# Patient Record
Sex: FEMALE | Race: WHITE | URBAN - METROPOLITAN AREA
[De-identification: names, ages, dates, MRNs, and addresses within clinical notes are randomized per-mention and may not be internally consistent; named-entity substitution may affect disease eponyms.]

---

## 2021-11-04 ENCOUNTER — HOSPITAL ENCOUNTER (OUTPATIENT)
Dept: LAB | Age: 56
Discharge: HOME | End: 2021-11-04

## 2021-11-04 PROCEDURE — 87624 HPV HI-RISK TYP POOLED RSLT: CPT

## 2021-11-04 PROCEDURE — 88175 CYTOPATH C/V AUTO FLUID REDO: CPT

## 2023-01-16 NOTE — PROGRESS NOTES
[address] [facility] at [address] [place] [address]  [phone]    Hematology / Oncology Consult    Reason for Visit:   Alannah Gan is a 62 y.o. female who is seen in consultation at the request of [facility] for evaluation of breast cancer. Hematology Oncology Treatment History:     Diagnosis: Intraductal papilloma, R breast    Pathology:   4/19/22 Right breast mass biopsy: intraductal papilloma. Comment: The papilloma is somewhat fragmented. Immunoperoxidase stains were performed and smooth muscle heavy chain immunoperoxidase stain is intact, ruling out invasion. A CK 5/6 immunoperoxidase stain shows a mixed pattern ruling out in-situ carcinoma. History of Present Illness:   Alannah Day is a 62 y.o. female who is referred for intraductal papilloma. Very limited records sent to me prior to this visit. Patient reports that her PCP noticed a R breast lump, which led to mammogram and biopsy of R breast mass. Pt does not recall which month her mammogram was, but thinks it was in Jan or Feb 2022. Prior one was approx 3 years prior. Pt denies any nipple discharge, breast pain, etc. Breast biopsy in 4/2022 showed intraductal papilloma per outside records. However, pt states she was told she has breast cancer and referred to me for this reason. No chest pain, SOB, cough, abdominal pain, LN swelling, palpable breast mass per patient. Patient is French speaking and visit is conducted with video interpretor. Pt is accompanied by her granddaughter Walker Sutton and friend Balbir Priest today. History reviewed. No pertinent past medical history. Past Surgical History:   Procedure Laterality Date    HX OTHER SURGICAL Right     Pt not sure what happened during surgery but it happened under the breast.      Social History     Tobacco Use    Smoking status: Never    Smokeless tobacco: Not on file   Substance Use Topics    Alcohol use:  Yes     Alcohol/week: 1.0 standard drink Types: 1 Cans of beer per week     Comment: once a year      History reviewed. No pertinent family history. No family h/o breast cancer. Mother pass away from gastric cancer. Current Outpatient Medications   Medication Sig    loratadine (CLARITIN) 10 mg tablet Take 1 Tab by mouth daily. [place] 1 al yamini    acetaminophen (TYLENOL) 325 mg tablet Take 1 Tab by mouth every four (4) hours as needed for Pain.    mometasone (NASONEX) 50 mcg/actuation nasal spray 2 Sprays by Both Nostrils route daily. No current facility-administered medications for this visit. Allergies   Allergen Reactions    Seafood Rash        Review of Systems: A complete review of systems was obtained, negative except as described above. Physical Exam:   Blood pressure (!) 143/84, pulse 86, temperature 97.8 °F (36.6 °C), temperature source Oral, resp. rate 18, height 4' 9\" (1.448 m), weight 170 lb 9.6 oz (77.4 kg), SpO2 96 %. ECOG PS: 0  General: Well developed, no acute distress  Eyes: PERRLA, EOMI, anicteric sclerae  HENT: Atraumatic, OP clear  Neck: Supple, no JVD or thyromegaly  Lymphatic: No cervical, supraclavicular, axillary adenopathy  Breast: Bilateral breast exam normal without any masses, skin changes, nipple retraction. Respiratory: CTAB, normal respiratory effort  CV: Normal rate, regular rhythm, no murmurs, no peripheral edema  GI: Soft, nontender, nondistended, no masses, no hepatomegaly, no splenomegaly  MS: Normal gait and station. Digits without clubbing or cyanosis. Skin: No rashes, ecchymoses, or petechiae. Normal temperature, turgor, and texture. Neuro/Psych: Alert, oriented. 5/5 strength in all 4 extremities. Appropriate affect, normal judgment/insight.     Results:   No results found for: WBC, HGB, HCT, PLT, MCV, ANEU, HGBPOC, HCTPOC, HGBEXT, HCTEXT, PLTEXT  No results found for: NA, K, CL, CO2, GLU, BUN, CREA, GFRAA, GFRNA, CA, NAPOC, KPOCT, CLPOC, GLUCPOC, IBUN, CREAPOC, ICAI  No results found for: TBILI, ALT, AP, TP, ALB, GLOB  No results found for: IRON, FE, TIBC, IBCT, PSAT, FERR    No results found for: B12LT, FOL, RBCF  No results found for: TSH, TSH2, TSH3, TSHP, TSHEXT  No results found for: HAMAT, HAAB, HABT, HAAT, HBSAG, HBSB, HBSAT, HBABN, HBCM, HBCAB, HBCAT, XBCABS, HBEAB, HBEAG, XHEPCS, [id number], HBEGLT, HBCMLT, HBCLT, HBEBLT, [id number], [id number], [facility], [id number], HBCMLT, [id number], HCGAT      Imaging:     No radiology reports provided for my review. Assessment & Plan:   Rosa Barksdale is a 62 y.o. female is referred for intraductal papilloma. 1. Intraductal papilloma:  Discussed with patient that this is a benign growth in the milk duct of her breast, which can sometimes cause nipple discharge. I do not see any mention of breast cancer. No medical oncology management needed at this time. However, as an invasive cancer can sometimes be missed on a needle biopsy, I recommend she see a breast surgeon for follow up. Also, pt is due for repeat mammogram.   Attempted to call BENJI Williamson's office for additional records and information today, but no answer. -- Repeat mammogram now  -- Refer to Breast surgeon; appt made for Dr. Alberto Sargent 5/5/94  -- Request mammogram report from Feb 2022  -- Follow up as needed. 2. Health maintenance:  Discussed importance of annual mammogram, pelvic exam. Also recommend screening colonoscopy at age 36-53 and then per current screening guidelines. Pt to follow up with PCP regarding timing of these tests. I appreciate the opportunity to participate in Ms. Rosa Gan's care.     I personally spent a total of 45 minutes on patient care on date of this encounter, regarding the following:  Reviewing prior medical records which I summarized in the history above, taking history using video interpretor, discussing diagnoses with the patient as listed in the assessment, ordering blood tests and imaging as listed in the plan, arranging follow up as well as documentation of this encounter.      Signed By: Moshe Castañeda MD     January 16, 2023

## 2023-01-17 ENCOUNTER — OFFICE VISIT (OUTPATIENT)
Dept: ONCOLOGY | Age: 58
End: 2023-01-17

## 2023-01-17 VITALS
HEIGHT: 57 IN | OXYGEN SATURATION: 96 % | RESPIRATION RATE: 18 BRPM | HEART RATE: 86 BPM | DIASTOLIC BLOOD PRESSURE: 84 MMHG | WEIGHT: 170.6 LBS | TEMPERATURE: 97.8 F | SYSTOLIC BLOOD PRESSURE: 143 MMHG | BODY MASS INDEX: 36.8 KG/M2

## 2023-01-17 DIAGNOSIS — D24.1 INTRADUCTAL PAPILLOMA OF BREAST, RIGHT: Primary | ICD-10-CM

## 2023-01-17 DIAGNOSIS — Z00.00 HEALTHCARE MAINTENANCE: ICD-10-CM

## 2023-01-17 PROCEDURE — 99204 OFFICE O/P NEW MOD 45 MIN: CPT | Performed by: INTERNAL MEDICINE

## 2023-01-17 NOTE — PROGRESS NOTES
1. Have you been to the ER, urgent care clinic since your last visit? Hospitalized since your last visit? No    2. Have you seen or consulted any other health care providers outside of the [address] [place] [address] since your last visit? Include any pap smears or colon screening.  No        Visit Vitals  BP (!) 143/84 (BP 1 Location: Right upper arm, BP Patient Position: Sitting, BP Cuff Size: Large adult)   Pulse 86   Temp 97.8 °F (36.6 °C) (Oral)   Resp 18   Ht 4' 9\" (1.448 m)   Wt 170 lb 9.6 oz (77.4 kg)   SpO2 96%   BMI 36.92 kg/m²            Chief Complaint   Patient presents with    New Patient    Breast Cancer

## 2023-02-01 ENCOUNTER — DOCUMENTATION ONLY (OUTPATIENT)
Dept: SURGERY | Age: 58
End: 2023-02-01

## 2023-02-01 ENCOUNTER — OFFICE VISIT (OUTPATIENT)
Dept: SURGERY | Age: 58
End: 2023-02-01
Payer: MEDICAID

## 2023-02-01 VITALS — BODY MASS INDEX: 36.24 KG/M2 | WEIGHT: 168 LBS | HEIGHT: 57 IN

## 2023-02-01 DIAGNOSIS — D24.1 PAPILLOMA OF RIGHT BREAST: Primary | ICD-10-CM

## 2023-02-01 PROCEDURE — 76642 ULTRASOUND BREAST LIMITED: CPT | Performed by: SURGERY

## 2023-02-01 PROCEDURE — 99203 OFFICE O/P NEW LOW 30 MIN: CPT | Performed by: SURGERY

## 2023-02-01 RX ORDER — LOSARTAN POTASSIUM 50 MG/1
50 TABLET ORAL DAILY
COMMUNITY
Start: 2022-12-09

## 2023-02-01 NOTE — PROGRESS NOTES
Reports received and received notice that patient's mammogram discs were mailed to [facility] on 2/1/22.

## 2023-02-01 NOTE — PROGRESS NOTES
HISTORY OF PRESENT ILLNESS  Adrien Kirkpatrick is a 62 y.o. female. HPI  NEW patient consult referred by Dr. Opal Jain for RIGHT breast intraductal papilloma. Found by imaging. Denies any changes to breasts. Malina  ID #: [id number]          Family History: Mother, uterine cancer         No imaging available today. Patient says she gets mammograms done at [facility].          Review of Systems   All other systems reviewed and are negative. No past medical history on file. Past Surgical History:   Procedure Laterality Date    HX OTHER SURGICAL Right     Pt not sure what happened during surgery but it happened under the breast.       Social History     Socioeconomic History    Marital status: LEGALLY      Spouse name: Not on file    Number of children: Not on file    Years of education: Not on file    Highest education level: Not on file   Occupational History    Not on file   Tobacco Use    Smoking status: Never    Smokeless tobacco: Not on file   Vaping Use    Vaping Use: Never used   Substance and Sexual Activity    Alcohol use: Yes     Alcohol/week: 1.0 standard drink     Types: 1 Cans of beer per week     Comment: once a year    Drug use: Never    Sexual activity: Not on file   Other Topics Concern    Not on file   Social History Narrative    Not on file     Social Determinants of Health     Financial Resource Strain: Not on file   Food Insecurity: Not on file   Transportation Needs: Not on file   Physical Activity: Not on file   Stress: Not on file   Social Connections: Not on file   Intimate Partner Violence: Not on file   Housing Stability: Not on file       Current Outpatient Medications on File Prior to Visit   Medication Sig Dispense Refill    loratadine (CLARITIN) 10 mg tablet Take 1 Tab by mouth daily. [place] 1 al yamini 30 Tab 1    acetaminophen (TYLENOL) 325 mg tablet Take 1 Tab by mouth every four (4) hours as needed for Pain.  20 Tab 1    mometasone (NASONEX) 50 mcg/actuation nasal spray 2 Sprays by Both Nostrils route daily. (Patient not taking: Reported on 1/17/2023) 1 Container 2     No current facility-administered medications on file prior to visit. Allergies   Allergen Reactions    Seafood Rash       OB History    No obstetric history on file. ROS      Physical Exam  Exam conducted with a chaperone present. Constitutional:       Appearance: She is well-developed. She is not diaphoretic. HENT:      Head: Normocephalic and atraumatic. Right Ear: External ear normal.      Left Ear: External ear normal.   Eyes:      General: No scleral icterus. Right eye: No discharge. Left eye: No discharge. Pupils: Pupils are equal, round, and reactive to light. Neck:      Thyroid: No thyromegaly. Vascular: No JVD. Trachea: No tracheal deviation. Cardiovascular:      Rate and Rhythm: Normal rate and regular rhythm. Heart sounds: Normal heart sounds. Pulmonary:      Effort: Pulmonary effort is normal. No tachypnea, accessory muscle usage or respiratory distress. Breath sounds: Normal breath sounds. No stridor. Chest:   Breasts:     Breasts are symmetrical.      Right: No inverted nipple, mass, nipple discharge, skin change or tenderness. Left: No inverted nipple, mass, nipple discharge, skin change or tenderness. Abdominal:      General: There is no distension. Palpations: Abdomen is soft. There is no mass. Tenderness: There is no abdominal tenderness. Musculoskeletal:         General: Normal range of motion. Cervical back: Normal range of motion and neck supple. Lymphadenopathy:      Cervical: No cervical adenopathy. Skin:     General: Skin is warm and dry. Neurological:      Mental Status: She is alert and oriented to person, place, and time. Psychiatric:         Speech: Speech normal.         Behavior: Behavior normal.         Thought Content:  Thought content normal. Judgment: Judgment normal.           Imaging & Procedures  BREAST ULTRASOUND  Indication: RIGHT breast  Technique: The area was scanned using a high-frequency linear-array near-field transducer  Findings: Clip and biopsy site not visible  Impression: Intraductal papilloma  Disposition: Patient has elected for excision. ASSESSMENT and PLAN    ICD-10-CM ICD-9-CM    1. Papilloma of right breast  D24.1 1         New patient presents for evaluation of RIGHT breast intraductal papilloma, and is doing well overall. Upon physical examination of the RIGHT breast noted nothing palpable. Clip and biopsy site are not visible on US. Will schedule for ductal excision with magseed. This plan was reviewed with the patient and patient agrees. All questions were answered. Total time spent was 40 minutes.         Written by Dick Arias, as dictated by Dr. Emily Riley MD.

## 2023-02-01 NOTE — PROGRESS NOTES
HISTORY OF PRESENT ILLNESS  Keiko Navarro is a 62 y.o. female. HPI NEW patient consult referred by Dr. Xiang Sales for RIGHT breast intraductal papilloma. Found by imaging. Denies any changes to breasts. Malina Hill ID #: [id number]      Family History: Mother, uterine cancer       No imaging available today. Patient says she gets mammograms done at [facility].      Review of Systems   All other systems reviewed and are negative.     Physical Exam    ASSESSMENT and PLAN  {ASSESSMENT/PLAN:[id number]}

## 2023-02-01 NOTE — PROGRESS NOTES
Faxed imaging report request to [facility]. Also asked that they mail breast imaging CDs to [facility] office. Confirmation received.

## 2023-02-07 ENCOUNTER — DOCUMENTATION ONLY (OUTPATIENT)
Dept: SURGERY | Age: 58
End: 2023-02-07

## 2023-02-07 NOTE — PROGRESS NOTES
Received patient mammogram (CD) & reports from [facility] by mail. Will put in box on [email or web address].

## 2023-02-08 ENCOUNTER — DOCUMENTATION ONLY (OUTPATIENT)
Dept: SURGERY | Age: 58
End: 2023-02-08

## 2023-02-10 DIAGNOSIS — D24.1 BENIGN NEOPLASM OF RIGHT BREAST: Primary | ICD-10-CM

## 2023-02-24 ENCOUNTER — HOSPITAL ENCOUNTER (OUTPATIENT)
Dept: MAMMOGRAPHY | Age: 58
Discharge: HOME | End: 2023-02-24
Attending: SURGERY
Payer: MEDICAID

## 2023-02-24 DIAGNOSIS — D24.1 BENIGN NEOPLASM OF RIGHT BREAST: ICD-10-CM

## 2023-02-24 PROCEDURE — 74011000250 HC RX REV CODE- 250: Performed by: RADIOLOGY

## 2023-02-24 PROCEDURE — 19283 PERQ DEV BREAST 1ST STRTCTC: CPT

## 2023-02-24 RX ORDER — LIDOCAINE HYDROCHLORIDE 10 MG/ML
5 INJECTION INFILTRATION; PERINEURAL
Status: COMPLETED | OUTPATIENT
Start: 2023-02-24 | End: 2023-02-24

## 2023-02-24 RX ADMIN — LIDOCAINE HYDROCHLORIDE 5 ML: 10 INJECTION, SOLUTION INFILTRATION; PERINEURAL at 10:49

## 2023-03-03 ENCOUNTER — HOSPITAL ENCOUNTER (OUTPATIENT)
Dept: PREADMISSION TESTING | Age: 58
End: 2023-03-03
Payer: MEDICAID

## 2023-03-03 VITALS
HEIGHT: 57 IN | TEMPERATURE: 97.9 F | WEIGHT: 170.19 LBS | RESPIRATION RATE: 18 BRPM | SYSTOLIC BLOOD PRESSURE: 147 MMHG | DIASTOLIC BLOOD PRESSURE: 80 MMHG | BODY MASS INDEX: 36.72 KG/M2 | HEART RATE: 98 BPM

## 2023-03-03 LAB
ATRIAL RATE: 95 BPM
BASOPHILS # BLD: 0 K/UL (ref 0–0.1)
BASOPHILS NFR BLD: 1 % (ref 0–1)
CALCULATED P AXIS, ECG09: 46 DEGREES
CALCULATED R AXIS, ECG10: 10 DEGREES
CALCULATED T AXIS, ECG11: 54 DEGREES
DIAGNOSIS, 93000: NORMAL
DIFFERENTIAL METHOD BLD: ABNORMAL
EOSINOPHIL # BLD: 0.2 K/UL (ref 0–0.4)
EOSINOPHIL NFR BLD: 2 % (ref 0–7)
ERYTHROCYTE [DISTWIDTH] IN BLOOD BY AUTOMATED COUNT: 13 % (ref 11.5–14.5)
HCT VFR BLD AUTO: 39.7 % (ref 35–47)
HGB BLD-MCNC: 12.9 G/DL (ref 11.5–16)
IMM GRANULOCYTES # BLD AUTO: 0 K/UL (ref 0–0.04)
IMM GRANULOCYTES NFR BLD AUTO: 1 % (ref 0–0.5)
LYMPHOCYTES # BLD: 2.7 K/UL (ref 0.8–3.5)
LYMPHOCYTES NFR BLD: 30 % (ref 12–49)
MCH RBC QN AUTO: 29.5 PG (ref 26–34)
MCHC RBC AUTO-ENTMCNC: 32.5 G/DL (ref 30–36.5)
MCV RBC AUTO: 90.6 FL (ref 80–99)
MONOCYTES # BLD: 0.7 K/UL (ref 0–1)
MONOCYTES NFR BLD: 8 % (ref 5–13)
NEUTS SEG # BLD: 5.2 K/UL (ref 1.8–8)
NEUTS SEG NFR BLD: 58 % (ref 32–75)
NRBC # BLD: 0 K/UL (ref 0–0.01)
NRBC BLD-RTO: 0 PER 100 WBC
P-R INTERVAL, ECG05: 148 MS
PLATELET # BLD AUTO: 288 K/UL (ref 150–400)
PMV BLD AUTO: 10.9 FL (ref 8.9–12.9)
Q-T INTERVAL, ECG07: 360 MS
QRS DURATION, ECG06: 90 MS
QTC CALCULATION (BEZET), ECG08: 452 MS
RBC # BLD AUTO: 4.38 M/UL (ref 3.8–5.2)
VENTRICULAR RATE, ECG03: 95 BPM
WBC # BLD AUTO: 8.8 K/UL (ref 3.6–11)

## 2023-03-03 PROCEDURE — 93005 ELECTROCARDIOGRAM TRACING: CPT

## 2023-03-03 PROCEDURE — 85025 COMPLETE CBC W/AUTO DIFF WBC: CPT

## 2023-03-03 PROCEDURE — 36415 COLL VENOUS BLD VENIPUNCTURE: CPT

## 2023-03-03 NOTE — PERIOP NOTES
COPY OF COVID VACCINE CARD PLACED ON CHART. PT Latvian SPEAKING. PAT APPOINTMENT COMPLETED USING [employer] VIDEO  DANNA #[id number]. PT STATES SHE DOES NOT WANT TO RECEIVE ANY BLOOD PRODUCTS. PT STATES SHE RECEIVED A BLOOD TRANSFUSION MANY YEARS AGO AND IT WAS \"NOT GOOD\" FOR HER. SHE STATES SHE WILL ACCEPT HER FAMILY'S BLOOD. PT NEEDS TO SPEAK WITH PHYSICIAN. ABIMBOLA SCORE 5. REQUESTING SLEEP REFERRAL. PT INFO GIVEN TO Marlon Chambers NP.     PT STATES  [address]

## 2023-03-03 NOTE — PERIOP NOTES
[address] INSTRUCTIONS    Surgery Date:   3/9/23    Your surgeon's office or [facility] staff will call you between 4 PM- 8 PM the day before surgery with your arrival time. If your surgery is on a Monday, you will receive a call the preceding Friday. Please report to [address] Patient Access/Admitting on the 1st floor. Bring your insurance card, photo identification, and any copayment ( if applicable). If you are going home the same day of your surgery, you must have a responsible adult to drive you home. You need to have a responsible adult to stay with you the first 24 hours after surgery and you should not drive a car for 24 hours following your surgery. Do NOT eat any solid foods after midnight the night before surgery including candy, mint or gum. You may drink clear liquids from midnight until 1 hour prior to your arrival. You may drink up to 12 ounces at one time every 4 hours. Please note special instructions, if applicable, below for medications. Do NOT drink alcohol or smoke 24 hours before surgery. STOP smoking for 14 days prior as it helps with breathing and healing after surgery. If you are being admitted to the hospital, please leave personal belongings/luggage in your car until you have an assigned hospital room number. Please wear comfortable clothes. Wear your glasses instead of contacts. We ask that all money, jewelry and valuables be left at home. Wear no make up, particularly mascara, the day of surgery. All body piercings, rings, and jewelry need to be removed and left at home. Please remove any nail polish or artifical nails from your fingernails. Please wear your hair loose or down. Please no pony-tails, buns, or any metal hair accessories. If you shower the morning of surgery, please do not apply any lotions or powders afterwards. You may wear deodorant, unless having breast surgery. Do not shave any body area within 24 hours of your surgery.   Please follow all instructions to avoid any potential surgical cancellation. Should your physical condition change, (i.e. fever, cold, flu, etc.) please notify your surgeon as soon as possible. It is important to be on time. If a situation occurs where you may be delayed, please call:  [phone] / [phone] on the day of surgery. The Preadmission Testing staff can be reached at [phone]. Special instructions: NONE      No current outpatient medications on file. No current facility-administered medications for this encounter. YOU MUST ONLY TAKE THESE MEDICATIONS THE MORNING OF SURGERY WITH A SIP OF WATER: NONE  MEDICATIONS TO TAKE THE MORNING OF SURGERY ONLY IF NEEDED: NONE  HOLD these prescription medications BEFORE Surgery: NONE  Ask your surgeon/prescribing physician about when/if to STOP taking these medications: NONE  Stop all vitamins, herbal medicines and Aspirin containing products 7 days prior to surgery. Stop any non-steroidal anti-inflammatory drugs (i.e. Ibuprofen, Naproxen, Advil, Aleve) 3 days before surgery. You may take Tylenol. If you are currently taking Plavix, Coumadin,or any other blood-thinning/anticoagulant medication contact your prescribing physician for instructions. Eating and Drinking Before Surgery    You may eat a regular dinner at the usual time on the day before your surgery. Do NOT eat any solid foods after midnight unless your arrival time at the hospital is 3pm or later. You may drink clear liquids only from 12 midnight until 1 hours prior to your arrival time at the hospital on the day of your surgery. Do NOT drink alcohol.   Clear liquids include:  Water  Fruit juices without pulp( i.e. apple juice)  Carbonated beverages  Black coffee (no cream/milk)  Tea (no cream/milk)  Gatorade  You may drink up to 12-16 ounces at one time every 4 hours between the hours of midnight and 1 hour before your arrival time at the hospital. Example- if your arrival time at the hospital is 6am, you may drink 12-16 ounces of clear liquids no later than 5am.  If your arrival time at the hospital is 3pm or later, you may eat a light breakfast before 8am.  A light breakfast includes: Toast or bagel (no butter)  Black coffee (no cream/milk)  Tea (no cream/milk)  Fruit juices without pulp ( i.e. apple juice)  Do NOT eat meat, eggs, vegetables or fruit  If you have any questions, please contact your surgeon's office. Preventing Infections Before and After - Your Surgery    IMPORTANT INSTRUCTIONS    You play an important role in your health and preparation for surgery. To reduce the germs on your skin you will need to shower with CHG soap (Chorhexidine gluconate 4%) two times before surgery. CHG soap (Hibiclens, Hex-A-Clens or store brand)  CHG soap will be provided at your Preadmission Testing (PAT) appointment. If you do not have a PAT appointment before surgery, you may arrange to  CHG soap from our office or purchase CHG soap at a pharmacy, grocery or department store. You need to purchase TWO 4 ounce bottles to use for your 2 showers. Steps to follow:  Maridee Pac your hair with your normal shampoo and your body with regular soap and rinse well to remove shampoo and soap from your skin. Wet a clean washcloth and turn off the shower. Put CHG soap on washcloth and apply to your entire body from the neck down. Do not use on your head, face or private parts(genitals). Do not use CHG soap on open sores, wounds or areas of skin irritation. Wash you body gently for 5 minutes. Do not wash your skin too hard. This soap does not create lather. Pay special attention to your underarms and from your belly button to your feet. Turn the shower back on and rinse well to get CHG soap off your body. Pat your skin dry with a clean, dry towel. Do not apply lotions or moisturizer. Put on clean clothes and sleep on fresh bed sheets and do not allow pets to sleep with you.     Shower with CHG soap 2 times before your surgery  The evening before your surgery  The morning of your surgery      Tips to help prevent infections after your surgery:  Protect your surgical wound from germs:  Hand washing is the most important thing you and your caregivers can do to prevent infections. Keep your bandage clean and dry! Do not touch your surgical wound. Use clean, freshly washed towels and washcloths every time you shower; do not share bath linens with others. Until your surgical wound is healed, wear clothing and sleep on bed linens each day that are clean and freshly washed. Do not allow pets to sleep in your bed with you or touch your surgical wound. Do not smoke - smoking delays wound healing. This may be a good time to stop smoking. If you have diabetes, it is important for you to manage your blood sugar levels properly before your surgery as well as after your surgery. Poorly managed blood sugar levels slow down wound healing and prevent you from healing completely. Patient Information Regarding COVID Restrictions      Day of Procedure    Please park in the parking deck or any designated visitor parking lot. Enter the facility through the Main Entrance of the hospital.  On the day of surgery, please provide the cell phone number of the person who will be waiting for you to the Patient Access representative at the time of registration. Masks are highly recommended in the hospital, but not required. Once your procedure and the immediate recovery period is completed, a nurse in the recovery area will contact your designated visitor to inform them of your room number or to otherwise review other pertinent information regarding your care. Social distancing practices are strongly encouraged in waiting areas and the cafeteria. The patient and  were contacted  in person. They verbalized understanding of all instructions and do not  need reinforcement.

## 2023-03-09 ENCOUNTER — ANESTHESIA (OUTPATIENT)
Dept: MEDSURG UNIT | Age: 58
End: 2023-03-09
Payer: MEDICAID

## 2023-03-09 ENCOUNTER — HOSPITAL ENCOUNTER (OUTPATIENT)
Age: 58
Setting detail: OUTPATIENT SURGERY
Discharge: HOME | End: 2023-03-09
Attending: SURGERY | Admitting: SURGERY
Payer: MEDICAID

## 2023-03-09 ENCOUNTER — ANESTHESIA EVENT (OUTPATIENT)
Dept: MEDSURG UNIT | Age: 58
End: 2023-03-09
Payer: MEDICAID

## 2023-03-09 VITALS
HEART RATE: 97 BPM | SYSTOLIC BLOOD PRESSURE: 137 MMHG | OXYGEN SATURATION: 96 % | RESPIRATION RATE: 28 BRPM | BODY MASS INDEX: 36.79 KG/M2 | WEIGHT: 170 LBS | TEMPERATURE: 97.8 F | DIASTOLIC BLOOD PRESSURE: 87 MMHG

## 2023-03-09 DIAGNOSIS — D24.1 BENIGN NEOPLASM OF RIGHT BREAST: ICD-10-CM

## 2023-03-09 PROCEDURE — 77030002996 HC SUT SLK J&J -A: Performed by: SURGERY

## 2023-03-09 PROCEDURE — 2709999900 HC NON-CHARGEABLE SUPPLY: Performed by: SURGERY

## 2023-03-09 PROCEDURE — 74011250636 HC RX REV CODE- 250/636: Performed by: ANESTHESIOLOGY

## 2023-03-09 PROCEDURE — 77030011266 HC ELECTRD BLD INSL COVD -A: Performed by: SURGERY

## 2023-03-09 PROCEDURE — 76210000035 HC AMBSU PH I REC 1 TO 1.5 HR: Performed by: SURGERY

## 2023-03-09 PROCEDURE — 74011000250 HC RX REV CODE- 250: Performed by: NURSE ANESTHETIST, CERTIFIED REGISTERED

## 2023-03-09 PROCEDURE — 88342 IMHCHEM/IMCYTCHM 1ST ANTB: CPT

## 2023-03-09 PROCEDURE — 77030010507 HC ADH SKN DERMBND J&J -B: Performed by: SURGERY

## 2023-03-09 PROCEDURE — 76030000000 HC AMB SURG OR TIME 0.5 TO 1: Performed by: SURGERY

## 2023-03-09 PROCEDURE — 74011000250 HC RX REV CODE- 250: Performed by: SURGERY

## 2023-03-09 PROCEDURE — 88307 TISSUE EXAM BY PATHOLOGIST: CPT

## 2023-03-09 PROCEDURE — 88341 IMHCHEM/IMCYTCHM EA ADD ANTB: CPT

## 2023-03-09 PROCEDURE — 77030040361 HC SLV COMPR DVT MDII -B: Performed by: SURGERY

## 2023-03-09 PROCEDURE — 76060000061 HC AMB SURG ANES 0.5 TO 1 HR: Performed by: SURGERY

## 2023-03-09 PROCEDURE — 77030031139 HC SUT VCRL2 J&J -A: Performed by: SURGERY

## 2023-03-09 PROCEDURE — 77030002933 HC SUT MCRYL J&J -A: Performed by: SURGERY

## 2023-03-09 PROCEDURE — 74011250636 HC RX REV CODE- 250/636: Performed by: NURSE ANESTHETIST, CERTIFIED REGISTERED

## 2023-03-09 PROCEDURE — 77030010509 HC AIRWY LMA MSK TELE -A: Performed by: ANESTHESIOLOGY

## 2023-03-09 PROCEDURE — 19125 EXCISION BREAST LESION: CPT | Performed by: SURGERY

## 2023-03-09 RX ORDER — SODIUM CHLORIDE 0.9 % (FLUSH) 0.9 %
5-40 SYRINGE (ML) INJECTION AS NEEDED
Status: DISCONTINUED | OUTPATIENT
Start: 2023-03-09 | End: 2023-03-09 | Stop reason: HOSPADM

## 2023-03-09 RX ORDER — SODIUM CHLORIDE 9 MG/ML
50 INJECTION, SOLUTION INTRAVENOUS CONTINUOUS
Status: DISCONTINUED | OUTPATIENT
Start: 2023-03-09 | End: 2023-03-09 | Stop reason: HOSPADM

## 2023-03-09 RX ORDER — MIDAZOLAM HYDROCHLORIDE 1 MG/ML
INJECTION, SOLUTION INTRAMUSCULAR; INTRAVENOUS AS NEEDED
Status: DISCONTINUED | OUTPATIENT
Start: 2023-03-09 | End: 2023-03-09 | Stop reason: HOSPADM

## 2023-03-09 RX ORDER — MIDAZOLAM HYDROCHLORIDE 1 MG/ML
0.5 INJECTION, SOLUTION INTRAMUSCULAR; INTRAVENOUS
Status: DISCONTINUED | OUTPATIENT
Start: 2023-03-09 | End: 2023-03-09 | Stop reason: HOSPADM

## 2023-03-09 RX ORDER — FENTANYL CITRATE 50 UG/ML
INJECTION, SOLUTION INTRAMUSCULAR; INTRAVENOUS AS NEEDED
Status: DISCONTINUED | OUTPATIENT
Start: 2023-03-09 | End: 2023-03-09 | Stop reason: HOSPADM

## 2023-03-09 RX ORDER — FENTANYL CITRATE 50 UG/ML
25 INJECTION, SOLUTION INTRAMUSCULAR; INTRAVENOUS
Status: DISCONTINUED | OUTPATIENT
Start: 2023-03-09 | End: 2023-03-09 | Stop reason: HOSPADM

## 2023-03-09 RX ORDER — SODIUM CHLORIDE 9 MG/ML
1000 INJECTION, SOLUTION INTRAVENOUS CONTINUOUS
Status: DISCONTINUED | OUTPATIENT
Start: 2023-03-09 | End: 2023-03-09 | Stop reason: HOSPADM

## 2023-03-09 RX ORDER — HYDROMORPHONE HYDROCHLORIDE 1 MG/ML
0.2 INJECTION, SOLUTION INTRAMUSCULAR; INTRAVENOUS; SUBCUTANEOUS
Status: DISCONTINUED | OUTPATIENT
Start: 2023-03-09 | End: 2023-03-09 | Stop reason: HOSPADM

## 2023-03-09 RX ORDER — MORPHINE SULFATE 2 MG/ML
2 INJECTION, SOLUTION INTRAMUSCULAR; INTRAVENOUS
Status: DISCONTINUED | OUTPATIENT
Start: 2023-03-09 | End: 2023-03-09 | Stop reason: HOSPADM

## 2023-03-09 RX ORDER — LIDOCAINE HYDROCHLORIDE 10 MG/ML
0.1 INJECTION, SOLUTION EPIDURAL; INFILTRATION; INTRACAUDAL; PERINEURAL AS NEEDED
Status: DISCONTINUED | OUTPATIENT
Start: 2023-03-09 | End: 2023-03-09 | Stop reason: HOSPADM

## 2023-03-09 RX ORDER — PROPOFOL 10 MG/ML
INJECTION, EMULSION INTRAVENOUS AS NEEDED
Status: DISCONTINUED | OUTPATIENT
Start: 2023-03-09 | End: 2023-03-09 | Stop reason: HOSPADM

## 2023-03-09 RX ORDER — MIDAZOLAM HYDROCHLORIDE 1 MG/ML
1 INJECTION, SOLUTION INTRAMUSCULAR; INTRAVENOUS AS NEEDED
Status: DISCONTINUED | OUTPATIENT
Start: 2023-03-09 | End: 2023-03-09 | Stop reason: HOSPADM

## 2023-03-09 RX ORDER — LIDOCAINE HYDROCHLORIDE 20 MG/ML
INJECTION, SOLUTION EPIDURAL; INFILTRATION; INTRACAUDAL; PERINEURAL AS NEEDED
Status: DISCONTINUED | OUTPATIENT
Start: 2023-03-09 | End: 2023-03-09 | Stop reason: HOSPADM

## 2023-03-09 RX ORDER — SODIUM CHLORIDE, SODIUM LACTATE, POTASSIUM CHLORIDE, CALCIUM CHLORIDE 600; 310; 30; 20 MG/100ML; MG/100ML; MG/100ML; MG/100ML
125 INJECTION, SOLUTION INTRAVENOUS CONTINUOUS
Status: DISCONTINUED | OUTPATIENT
Start: 2023-03-09 | End: 2023-03-09 | Stop reason: HOSPADM

## 2023-03-09 RX ORDER — FENTANYL CITRATE 50 UG/ML
50 INJECTION, SOLUTION INTRAMUSCULAR; INTRAVENOUS AS NEEDED
Status: DISCONTINUED | OUTPATIENT
Start: 2023-03-09 | End: 2023-03-09 | Stop reason: HOSPADM

## 2023-03-09 RX ORDER — DIPHENHYDRAMINE HYDROCHLORIDE 50 MG/ML
12.5 INJECTION, SOLUTION INTRAMUSCULAR; INTRAVENOUS AS NEEDED
Status: DISCONTINUED | OUTPATIENT
Start: 2023-03-09 | End: 2023-03-09 | Stop reason: HOSPADM

## 2023-03-09 RX ORDER — ONDANSETRON 2 MG/ML
4 INJECTION INTRAMUSCULAR; INTRAVENOUS AS NEEDED
Status: DISCONTINUED | OUTPATIENT
Start: 2023-03-09 | End: 2023-03-09 | Stop reason: HOSPADM

## 2023-03-09 RX ORDER — PHENYLEPHRINE HCL IN 0.9% NACL 0.4MG/10ML
SYRINGE (ML) INTRAVENOUS AS NEEDED
Status: DISCONTINUED | OUTPATIENT
Start: 2023-03-09 | End: 2023-03-09 | Stop reason: HOSPADM

## 2023-03-09 RX ORDER — DEXAMETHASONE SODIUM PHOSPHATE 4 MG/ML
INJECTION, SOLUTION INTRA-ARTICULAR; INTRALESIONAL; INTRAMUSCULAR; INTRAVENOUS; SOFT TISSUE AS NEEDED
Status: DISCONTINUED | OUTPATIENT
Start: 2023-03-09 | End: 2023-03-09 | Stop reason: HOSPADM

## 2023-03-09 RX ORDER — OXYCODONE HYDROCHLORIDE 10 MG/1
10 TABLET ORAL
Qty: 8 TABLET | Refills: 0 | Status: SHIPPED | OUTPATIENT
Start: 2023-03-09 | End: 2023-03-12

## 2023-03-09 RX ORDER — LIDOCAINE HYDROCHLORIDE AND EPINEPHRINE 10; 10 MG/ML; UG/ML
30 INJECTION, SOLUTION INFILTRATION; PERINEURAL ONCE
Status: CANCELLED | OUTPATIENT
Start: 2023-03-09 | End: 2023-03-09

## 2023-03-09 RX ORDER — BUPIVACAINE HYDROCHLORIDE AND EPINEPHRINE 5; 5 MG/ML; UG/ML
30 INJECTION, SOLUTION EPIDURAL; INTRACAUDAL; PERINEURAL ONCE
Status: CANCELLED | OUTPATIENT
Start: 2023-03-09 | End: 2023-03-09

## 2023-03-09 RX ORDER — SODIUM CHLORIDE 0.9 % (FLUSH) 0.9 %
5-40 SYRINGE (ML) INJECTION EVERY 8 HOURS
Status: DISCONTINUED | OUTPATIENT
Start: 2023-03-09 | End: 2023-03-09 | Stop reason: HOSPADM

## 2023-03-09 RX ORDER — ONDANSETRON 2 MG/ML
INJECTION INTRAMUSCULAR; INTRAVENOUS AS NEEDED
Status: DISCONTINUED | OUTPATIENT
Start: 2023-03-09 | End: 2023-03-09 | Stop reason: HOSPADM

## 2023-03-09 RX ADMIN — FENTANYL CITRATE 50 MCG: 50 INJECTION, SOLUTION INTRAMUSCULAR; INTRAVENOUS at 11:17

## 2023-03-09 RX ADMIN — Medication 120 MCG: at 11:08

## 2023-03-09 RX ADMIN — FENTANYL CITRATE 25 MCG: 50 INJECTION, SOLUTION INTRAMUSCULAR; INTRAVENOUS at 12:27

## 2023-03-09 RX ADMIN — Medication 120 MCG: at 11:02

## 2023-03-09 RX ADMIN — FENTANYL CITRATE 25 MCG: 50 INJECTION, SOLUTION INTRAMUSCULAR; INTRAVENOUS at 12:32

## 2023-03-09 RX ADMIN — FENTANYL CITRATE 50 MCG: 50 INJECTION, SOLUTION INTRAMUSCULAR; INTRAVENOUS at 10:54

## 2023-03-09 RX ADMIN — FENTANYL CITRATE 25 MCG: 50 INJECTION, SOLUTION INTRAMUSCULAR; INTRAVENOUS at 12:17

## 2023-03-09 RX ADMIN — Medication 120 MCG: at 11:28

## 2023-03-09 RX ADMIN — PROPOFOL 150 MG: 10 INJECTION, EMULSION INTRAVENOUS at 10:54

## 2023-03-09 RX ADMIN — SODIUM CHLORIDE, POTASSIUM CHLORIDE, SODIUM LACTATE AND CALCIUM CHLORIDE 125 ML/HR: 600; 310; 30; 20 INJECTION, SOLUTION INTRAVENOUS at 10:35

## 2023-03-09 RX ADMIN — MIDAZOLAM 2 MG: 1 INJECTION INTRAMUSCULAR; INTRAVENOUS at 10:47

## 2023-03-09 RX ADMIN — ONDANSETRON HYDROCHLORIDE 4 MG: 2 INJECTION, SOLUTION INTRAMUSCULAR; INTRAVENOUS at 10:59

## 2023-03-09 RX ADMIN — LIDOCAINE HYDROCHLORIDE 80 MG: 20 INJECTION, SOLUTION EPIDURAL; INFILTRATION; INTRACAUDAL; PERINEURAL at 10:54

## 2023-03-09 RX ADMIN — DEXAMETHASONE SODIUM PHOSPHATE 8 MG: 4 INJECTION, SOLUTION INTRAMUSCULAR; INTRAVENOUS at 10:59

## 2023-03-09 NOTE — H&P
HISTORY OF PRESENT ILLNESS  Idania Babb is a 62 y.o. female. HPI  NEW patient consult referred by Dr. March Skiff for RIGHT breast intraductal papilloma. Found by imaging. Denies any changes to breasts. Malina  ID #: [id number]           Family History: Mother, uterine cancer         No imaging available today. Patient says she gets mammograms done at [facility].           Review of Systems   All other systems reviewed and are negative. No past medical history on file. Past Surgical History:   Procedure Laterality Date    HX OTHER SURGICAL Right       Pt not sure what happened during surgery but it happened under the breast.         Social History            Socioeconomic History    Marital status: LEGALLY        Spouse name: Not on file    Number of children: Not on file    Years of education: Not on file    Highest education level: Not on file   Occupational History    Not on file   Tobacco Use    Smoking status: Never    Smokeless tobacco: Not on file   Vaping Use    Vaping Use: Never used   Substance and Sexual Activity    Alcohol use: Yes       Alcohol/week: 1.0 standard drink       Types: 1 Cans of beer per week       Comment: once a year    Drug use: Never    Sexual activity: Not on file   Other Topics Concern    Not on file   Social History Narrative    Not on file      Social Determinants of Health      Financial Resource Strain: Not on file   Food Insecurity: Not on file   Transportation Needs: Not on file   Physical Activity: Not on file   Stress: Not on file   Social Connections: Not on file   Intimate Partner Violence: Not on file   Housing Stability: Not on file                Current Outpatient Medications on File Prior to Visit   Medication Sig Dispense Refill    loratadine (CLARITIN) 10 mg tablet Take 1 Tab by mouth daily.  Aldora 1 al yamini 30 Tab 1    acetaminophen (TYLENOL) 325 mg tablet Take 1 Tab by mouth every four (4) hours as needed for Pain. 20 Tab 1    mometasone (NASONEX) 50 mcg/actuation nasal spray 2 Sprays by Both Nostrils route daily. (Patient not taking: Reported on 1/17/2023) 1 Container 2      No current facility-administered medications on file prior to visit. Allergies   Allergen Reactions    Seafood Rash         OB History    No obstetric history on file. ROS        Physical Exam  Exam conducted with a chaperone present. Constitutional:       Appearance: She is well-developed. She is not diaphoretic. HENT:      Head: Normocephalic and atraumatic. Right Ear: External ear normal.      Left Ear: External ear normal.   Eyes:      General: No scleral icterus. Right eye: No discharge. Left eye: No discharge. Pupils: Pupils are equal, round, and reactive to light. Neck:      Thyroid: No thyromegaly. Vascular: No JVD. Trachea: No tracheal deviation. Cardiovascular:      Rate and Rhythm: Normal rate and regular rhythm. Heart sounds: Normal heart sounds. Pulmonary:      Effort: Pulmonary effort is normal. No tachypnea, accessory muscle usage or respiratory distress. Breath sounds: Normal breath sounds. No stridor. Chest:   Breasts:     Breasts are symmetrical.      Right: No inverted nipple, mass, nipple discharge, skin change or tenderness. Left: No inverted nipple, mass, nipple discharge, skin change or tenderness. Abdominal:      General: There is no distension. Palpations: Abdomen is soft. There is no mass. Tenderness: There is no abdominal tenderness. Musculoskeletal:         General: Normal range of motion. Cervical back: Normal range of motion and neck supple. Lymphadenopathy:      Cervical: No cervical adenopathy. Skin:     General: Skin is warm and dry. Neurological:      Mental Status: She is alert and oriented to person, place, and time.    Psychiatric:         Speech: Speech normal.         Behavior: Behavior normal.         Thought Content: Thought content normal.         Judgment: Judgment normal.               Imaging & Procedures  BREAST ULTRASOUND  Indication: RIGHT breast  Technique: The area was scanned using a high-frequency linear-array near-field transducer  Findings: Clip and biopsy site not visible  Impression: Intraductal papilloma  Disposition: Patient has elected for excision. ASSESSMENT and PLAN      ICD-10-CM ICD-9-CM     1. Papilloma of right breast  D24.1 1            New patient presents for evaluation of RIGHT breast intraductal papilloma, and is doing well overall. Upon physical examination of the RIGHT breast noted nothing palpable. Clip and biopsy site are not visible on US. Will schedule for ductal excision with magseed. This plan was reviewed with the patient and patient agrees. All questions were answered.

## 2023-03-09 NOTE — PERIOP NOTES
1150: [place] [id number] #71 utilized for patient assessment. 0803: Dr. Anca Meehan at bedside utilizing  services speaking to patient and patient's daughter.  [facility] #[id number].    6269: I have reviewed discharge instructions utilizing  services with the patient and daughter. The patient and daughter verbalized understanding. All questions addressed at this time. A paper copy of these instructions have been given to the patient to take home.  [facility] #[id number].    9352: Patient discharged home with daughter.

## 2023-03-09 NOTE — DISCHARGE INSTRUCTIONS
Discharge Instructions from Dr. Nupur King    I will call you with the pathology results, typically within 1 week from today. You may shower, but no hot tubs, swimming pools, or baths until your incision is healed. No heavy lifting with the affected extremity (nothing greater than 5 pounds), and limit its use for the next 4-5 days. You may use an ice pack for comfort for the next couple of days, but do not place ice directly on the skin. Rather, use a towel or clothing to serve as a barrier between skin and ice to prevent injury. If I placed a drain, follow the drain instructions provided, especially as you keep a record of the drain output. Follow medication instructions carefully. Watch for signs of infection as listed below. Redness  Swelling  Drainage from the incision or from your nipple that appears infected  Fever over 101 degrees for consecutive readings, or over 99.5 if you are currently undergoing chemotherapy. Call our office (number is below) for a follow-up appointment. If you have any problems, our phone number is [phone].

## 2023-03-09 NOTE — H&P
HISTORY OF PRESENT ILLNESS  Janice Huerta is a 62 y.o. female. HPI  NEW patient consult referred by Dr. Milan Simon for RIGHT breast intraductal papilloma. Found by imaging. Denies any changes to breasts. Malina  ID #: [id number]           Family History: Mother, uterine cancer         No imaging available today. Patient says she gets mammograms done at [facility].           Review of Systems   All other systems reviewed and are negative. No past medical history on file. Past Surgical History:   Procedure Laterality Date    HX OTHER SURGICAL Right       Pt not sure what happened during surgery but it happened under the breast.         Social History            Socioeconomic History    Marital status: LEGALLY        Spouse name: Not on file    Number of children: Not on file    Years of education: Not on file    Highest education level: Not on file   Occupational History    Not on file   Tobacco Use    Smoking status: Never    Smokeless tobacco: Not on file   Vaping Use    Vaping Use: Never used   Substance and Sexual Activity    Alcohol use: Yes       Alcohol/week: 1.0 standard drink       Types: 1 Cans of beer per week       Comment: once a year    Drug use: Never    Sexual activity: Not on file   Other Topics Concern    Not on file   Social History Narrative    Not on file      Social Determinants of Health      Financial Resource Strain: Not on file   Food Insecurity: Not on file   Transportation Needs: Not on file   Physical Activity: Not on file   Stress: Not on file   Social Connections: Not on file   Intimate Partner Violence: Not on file   Housing Stability: Not on file                Current Outpatient Medications on File Prior to Visit   Medication Sig Dispense Refill    loratadine (CLARITIN) 10 mg tablet Take 1 Tab by mouth daily.  Ehrhardt 1 al yamini 30 Tab 1    acetaminophen (TYLENOL) 325 mg tablet Take 1 Tab by mouth every four (4) hours as needed for Pain. 20 Tab 1    mometasone (NASONEX) 50 mcg/actuation nasal spray 2 Sprays by Both Nostrils route daily. (Patient not taking: Reported on 1/17/2023) 1 Container 2      No current facility-administered medications on file prior to visit. Allergies   Allergen Reactions    Seafood Rash         OB History    No obstetric history on file. ROS        Physical Exam  Exam conducted with a chaperone present. Constitutional:       Appearance: She is well-developed. She is not diaphoretic. HENT:      Head: Normocephalic and atraumatic. Right Ear: External ear normal.      Left Ear: External ear normal.   Eyes:      General: No scleral icterus. Right eye: No discharge. Left eye: No discharge. Pupils: Pupils are equal, round, and reactive to light. Neck:      Thyroid: No thyromegaly. Vascular: No JVD. Trachea: No tracheal deviation. Cardiovascular:      Rate and Rhythm: Normal rate and regular rhythm. Heart sounds: Normal heart sounds. Pulmonary:      Effort: Pulmonary effort is normal. No tachypnea, accessory muscle usage or respiratory distress. Breath sounds: Normal breath sounds. No stridor. Chest:   Breasts:     Breasts are symmetrical.      Right: No inverted nipple, mass, nipple discharge, skin change or tenderness. Left: No inverted nipple, mass, nipple discharge, skin change or tenderness. Abdominal:      General: There is no distension. Palpations: Abdomen is soft. There is no mass. Tenderness: There is no abdominal tenderness. Musculoskeletal:         General: Normal range of motion. Cervical back: Normal range of motion and neck supple. Lymphadenopathy:      Cervical: No cervical adenopathy. Skin:     General: Skin is warm and dry. Neurological:      Mental Status: She is alert and oriented to person, place, and time.    Psychiatric:         Speech: Speech normal.         Behavior: Behavior normal.         Thought Content: Thought content normal.         Judgment: Judgment normal.               Imaging & Procedures  BREAST ULTRASOUND  Indication: RIGHT breast  Technique: The area was scanned using a high-frequency linear-array near-field transducer  Findings: Clip and biopsy site not visible  Impression: Intraductal papilloma  Disposition: Patient has elected for excision. ASSESSMENT and PLAN      ICD-10-CM ICD-9-CM     1. Papilloma of right breast  D24.1 1            New patient presents for evaluation of RIGHT breast intraductal papilloma, and is doing well overall. Upon physical examination of the RIGHT breast noted nothing palpable. Clip and biopsy site are not visible on US. Will schedule for ductal excision with magseed. This plan was reviewed with the patient and patient agrees. All questions were answered.

## 2023-03-09 NOTE — DISCHARGE INSTRUCTIONS
Discharge Instructions from Dr. Anca Meehan    I will call you with the pathology results, typically within 1 week from today. You may shower, but no hot tubs, swimming pools, or baths until your incision is healed. No heavy lifting with the affected extremity (nothing greater than 5 pounds), and limit its use for the next 4-5 days. You may use an ice pack for comfort for the next couple of days, but do not place ice directly on the skin. Rather, use a towel or clothing to serve as a barrier between skin and ice to prevent injury. If I placed a drain, follow the drain instructions provided, especially as you keep a record of the drain output. Follow medication instructions carefully. Watch for signs of infection as listed below. Redness  Swelling  Drainage from the incision or from your nipple that appears infected  Fever over 101 degrees for consecutive readings, or over 99.5 if you are currently undergoing chemotherapy. Call our office (number is below) for a follow-up appointment. If you have any problems, our phone number is [phone].

## 2023-03-09 NOTE — PERIOP NOTES
1150: [place] [address] #[id number] utilized for patient assessment. 5054: Dr. Sameer Zee at bedside utilizing  services speaking to patient and patient's daughter.  [facility] #[id number].    4909: I have reviewed discharge instructions utilizing  services with the patient and daughter. The patient and daughter verbalized understanding. All questions addressed at this time. A paper copy of these instructions have been given to the patient to take home.  [facility] #[id number].    7070: Patient discharged home with daughter.

## 2023-03-09 NOTE — BRIEF OP NOTE
Brief Postoperative Note    Patient: Morena French  YOB: 1965  MRN: [id number]    Date of Procedure: 3/9/2023     Pre-Op Diagnosis: RIGHT BREAST PAPILLOMA    Post-Op Diagnosis: Same as preoperative diagnosis.       Procedure(s):  RIGHT BREAST EXCISIONAL BIOPSY WITH MAGSEED LOCALIZATION(2/24/23)    Surgeon(s):  Tino Caledra MD    Surgical Assistant: Surg Asst-1: Pamella Upton    Anesthesia: General     Estimated Blood Loss (mL): Minimal    Complications: None    Specimens:   ID Type Source Tests Collected by Time Destination   1 : RIGHT BREAST BIOPSY Fresh Breast  Tino Caldera MD 3/9/2023 1116 Pathology        Implants: * No implants in log *    Drains: * No LDAs found *    Findings: spec radiograph pos magseed and clip    Electronically Signed by Alicia Hdez MD on 3/1/1426 at 11:42 AM

## 2023-03-09 NOTE — ANESTHESIA PREPROCEDURE EVALUATION
Relevant Problems   No relevant active problems       Anesthetic History   No history of anesthetic complications            Review of Systems / Medical History  Patient summary reviewed, nursing notes reviewed and pertinent labs reviewed    Pulmonary  Within defined limits                 Neuro/Psych   Within defined limits           Cardiovascular  Within defined limits  Hypertension                   GI/Hepatic/Renal     GERD           Endo/Other  Within defined limits    Hypothyroidism  Obesity and arthritis     Other Findings                   Anesthetic Plan    ASA: 2  Anesthesia type: general          Induction: Intravenous  Anesthetic plan and risks discussed with: Patient

## 2023-03-09 NOTE — ANESTHESIA POSTPROCEDURE EVALUATION
Procedure(s):  RIGHT BREAST EXCISIONAL BIOPSY WITH MAGSEED LOCALIZATION(2/24/23). general    Anesthesia Post Evaluation      Multimodal analgesia: multimodal analgesia used between 6 hours prior to anesthesia start to PACU discharge  Patient location during evaluation: PACU  Patient participation: complete - patient participated  Level of consciousness: awake  Pain score: 2  Pain management: adequate  Airway patency: patent  Anesthetic complications: no  Cardiovascular status: acceptable  Respiratory status: acceptable  Hydration status: acceptable  Comments: I have evaluated the patient and meets criteria for discharge from PACU. Isabel Little MD  Post anesthesia nausea and vomiting:  controlled  Final Post Anesthesia Temperature Assessment:  Normothermia (36.0-37.5 degrees C)      INITIAL Post-op Vital signs:   Vitals Value Taken Time   /87 03/09/23 1230   Temp 36.6 °C (97.8 °F) 03/09/23 1136   Pulse 93 03/09/23 1236   Resp 19 03/09/23 1236   SpO2 93 % 03/09/23 1236   Vitals shown include unvalidated device data.

## 2023-03-10 NOTE — OP NOTES
[address]   OPERATIVE REPORT    Name:  Kasia Price  MR#:  [id number]  :  1965  ACCOUNT #:  [de-identified]  DATE OF SERVICE:  2023    PREOPERATIVE DIAGNOSIS:  Intraductal papilloma of the right breast.    POSTOPERATIVE DIAGNOSIS:  Intraductal papilloma of the right breast.    PROCEDURE PERFORMED:  Right breast biopsy with Magseed localization. SURGEON:  Arnoldo Pimentel MD    ASSISTANT:  Kinza Justice. ANESTHESIA:  General.    COMPLICATIONS:  None. SPECIMENS REMOVED:  Right breast tissue. IMPLANTS:  None. ESTIMATED BLOOD LOSS:  Minimal.  Complications none. INDICATIONS:  The patient is a 59-year-old female who had a core biopsy revealing intraductal papilloma. She was admitted for excisional biopsy. PROCEDURE:  After Magseed localization in Radiology, the patient was brought to the operating room. After satisfactory induction of general LMA anesthesia, the patient was prepped and draped in sterile fashion. Utilizing the [facility], Magseed was localized in the circumareolar area. A circumareolar incision was made. It was deepened through subcutaneous tissues with Bovie cautery. A standard biopsy was performed. The  specimen was removed. Magseed presence was confirmed in the specimen, and a specimen radiograph was obtained, which revealed the presence of the clip and the [facility] within the specimen. All dissection planes were hemostatic. The wound was anesthetized with 0.5% Marcaine. It was closed with interrupted 0 Vicryl and a running subcuticular 4-0 Monocryl on the skin. The patient tolerated the procedure well. There were no complications. She was taken to recovery room in stable condition.       MD FORREST Mukherjee/[id number]  D:  2023 12:38  T:  03/10/2023 0:13  JOB #:  [id number]  CC:

## 2023-03-10 NOTE — OP NOTES
[address]   OPERATIVE REPORT    Name:  Ramos Lopez  MR#:  [id number]  :  1965  ACCOUNT #:  [de-identified]  DATE OF SERVICE:  2023    PREOPERATIVE DIAGNOSIS:  Intraductal papilloma of the right breast.    POSTOPERATIVE DIAGNOSIS:  Intraductal papilloma of the right breast.    PROCEDURE PERFORMED:  Right breast biopsy with Magseed localization. SURGEON:  Tuan Lane MD    ASSISTANT:  Avery Serrano. ANESTHESIA:  General.    COMPLICATIONS:  None. SPECIMENS REMOVED:  Right breast tissue. IMPLANTS:  None. ESTIMATED BLOOD LOSS:  Minimal.  Complications none. INDICATIONS:  The patient is a 49-year-old female who had a core biopsy revealing intraductal papilloma. She was admitted for excisional biopsy. PROCEDURE:  After Magseed localization in Radiology, the patient was brought to the operating room. After satisfactory induction of general LMA anesthesia, the patient was prepped and draped in sterile fashion. Utilizing the [facility], Magseed was localized in the circumareolar area. A circumareolar incision was made. It was deepened through subcutaneous tissues with Bovie cautery. A standard biopsy was performed. The  specimen was removed. Magseed presence was confirmed in the specimen, and a specimen radiograph was obtained, which revealed the presence of the clip and the [facility] within the specimen. All dissection planes were hemostatic. The wound was anesthetized with 0.5% Marcaine. It was closed with interrupted 0 Vicryl and a running subcuticular 4-0 Monocryl on the skin. The patient tolerated the procedure well. There were no complications. She was taken to recovery room in stable condition.       MD AMRIT Cottrell/[id number]  D:  2023 12:38  T:  03/10/2023 0:13  JOB #:  [id number]  CC:

## 2023-03-13 ENCOUNTER — TELEPHONE (OUTPATIENT)
Dept: SURGERY | Age: 58
End: 2023-03-13

## 2023-03-13 NOTE — TELEPHONE ENCOUNTER
Patient's daughter called to say her mother needs a letter to stay out of work. I wrote a letter for her RTW until Monday 3/20/23. She will pick it up at [facility]. She was appreciative.

## 2023-03-14 NOTE — TELEPHONE ENCOUNTER
The patient's daughter called to request work note be emailed to her and the patient's manager. I have emailed it to her at [email or web address] and the patient's manager at [email or web address] per her request.

## 2023-03-21 ENCOUNTER — OFFICE VISIT (OUTPATIENT)
Dept: SURGERY | Age: 58
End: 2023-03-21
Payer: MEDICAID

## 2023-03-21 VITALS — HEIGHT: 57 IN | BODY MASS INDEX: 36.68 KG/M2 | WEIGHT: 170 LBS

## 2023-03-21 DIAGNOSIS — D24.1 PAPILLOMA OF RIGHT BREAST: Primary | ICD-10-CM

## 2023-03-21 PROCEDURE — 99024 POSTOP FOLLOW-UP VISIT: CPT | Performed by: NURSE PRACTITIONER

## 2023-03-21 NOTE — PROGRESS NOTES
HISTORY OF PRESENT ILLNESS  Baylee Merino is a 62 y.o. female. HPI Established patient presents for a post-op visit. Good [employer] phone . Breast history -   Referring - Dr. Peri Otto breast papilloma  3/9/23 - Breast, right, excision:   Intraductal papilloma. Columnar cell hyperplasia. Changes consistent with prior biopsy/surgical site. No evidence for malignancy. Family history -   Mother, uterine cancer   ROS    Physical Exam  Chest:           ASSESSMENT and PLAN    ICD-10-CM ICD-9-CM    1. Papilloma of right breast  D24.1 217         Incision CDI and healing well. Reviewed pathology. Cleared for all activity; encouraged a supportive bra. Continue annual mammograms. RTC here PRN. She is comfortable with this plan. All questions answered and she stated understanding.

## 2023-05-05 ENCOUNTER — DOCUMENTATION ONLY (OUTPATIENT)
Dept: SURGERY | Age: 58
End: 2023-05-05

## 2023-08-18 ENCOUNTER — HOSPITAL ENCOUNTER (OUTPATIENT)
Facility: HOSPITAL | Age: 58
Setting detail: SPECIMEN
Discharge: HOME | End: 2023-08-21

## 2023-08-18 PROCEDURE — 84443 ASSAY THYROID STIM HORMONE: CPT

## 2023-08-18 PROCEDURE — 84439 ASSAY OF FREE THYROXINE: CPT

## 2023-08-18 PROCEDURE — 36415 COLL VENOUS BLD VENIPUNCTURE: CPT

## 2023-08-19 LAB
T4 FREE SERPL-MCNC: 1.9 NG/DL (ref 0.8–1.5)
TSH SERPL DL<=0.05 MIU/L-ACNC: <0.01 UIU/ML (ref 0.36–3.74)

## 2024-02-22 ENCOUNTER — CLINICAL DOCUMENTATION (OUTPATIENT)
Age: 59
End: 2024-02-22

## 2024-09-20 ENCOUNTER — TRANSCRIBE ORDERS (OUTPATIENT)
Facility: HOSPITAL | Age: 59
End: 2024-09-20

## 2024-09-20 DIAGNOSIS — R74.8 ELEVATED SERUM ALKALINE PHOSPHATASE LEVEL: Primary | ICD-10-CM

## 2024-10-04 ENCOUNTER — HOSPITAL ENCOUNTER (OUTPATIENT)
Facility: HOSPITAL | Age: 59
Discharge: HOME | End: 2024-10-07
Payer: MEDICAID

## 2024-10-04 DIAGNOSIS — R74.8 ELEVATED SERUM ALKALINE PHOSPHATASE LEVEL: ICD-10-CM

## 2024-10-04 PROCEDURE — 76700 US EXAM ABDOM COMPLETE: CPT

## 2025-01-10 ENCOUNTER — HOSPITAL ENCOUNTER (OUTPATIENT)
Facility: HOSPITAL | Age: 60
Discharge: HOME | End: 2025-01-13
Payer: MEDICAID

## 2025-01-10 ENCOUNTER — OFFICE VISIT (OUTPATIENT)
Age: 60
End: 2025-01-10
Payer: MEDICAID

## 2025-01-10 VITALS
SYSTOLIC BLOOD PRESSURE: 122 MMHG | HEIGHT: 57 IN | DIASTOLIC BLOOD PRESSURE: 78 MMHG | TEMPERATURE: 98.1 F | BODY MASS INDEX: 35.38 KG/M2 | HEART RATE: 96 BPM | WEIGHT: 164 LBS | OXYGEN SATURATION: 96 %

## 2025-01-10 DIAGNOSIS — M25.511 ACUTE PAIN OF BOTH SHOULDERS: ICD-10-CM

## 2025-01-10 DIAGNOSIS — G89.29 CHRONIC PAIN OF RIGHT KNEE: ICD-10-CM

## 2025-01-10 DIAGNOSIS — Z13.220 SCREENING FOR HYPERLIPIDEMIA: ICD-10-CM

## 2025-01-10 DIAGNOSIS — M25.512 ACUTE PAIN OF BOTH SHOULDERS: ICD-10-CM

## 2025-01-10 DIAGNOSIS — M25.561 CHRONIC PAIN OF RIGHT KNEE: ICD-10-CM

## 2025-01-10 DIAGNOSIS — Z76.89 ENCOUNTER TO ESTABLISH CARE: ICD-10-CM

## 2025-01-10 DIAGNOSIS — Z11.59 NEED FOR HEPATITIS C SCREENING TEST: ICD-10-CM

## 2025-01-10 DIAGNOSIS — M79.601 PAIN IN BOTH UPPER EXTREMITIES: ICD-10-CM

## 2025-01-10 DIAGNOSIS — Z11.4 SCREENING FOR HIV (HUMAN IMMUNODEFICIENCY VIRUS): ICD-10-CM

## 2025-01-10 DIAGNOSIS — Z76.89 ENCOUNTER TO ESTABLISH CARE: Primary | ICD-10-CM

## 2025-01-10 DIAGNOSIS — Z13.1 SCREENING FOR DIABETES MELLITUS: ICD-10-CM

## 2025-01-10 DIAGNOSIS — M79.602 PAIN IN BOTH UPPER EXTREMITIES: ICD-10-CM

## 2025-01-10 PROCEDURE — 99204 OFFICE O/P NEW MOD 45 MIN: CPT | Performed by: CLINICAL NURSE SPECIALIST

## 2025-01-10 PROCEDURE — 73030 X-RAY EXAM OF SHOULDER: CPT

## 2025-01-10 PROCEDURE — 73562 X-RAY EXAM OF KNEE 3: CPT

## 2025-01-10 RX ORDER — LOSARTAN POTASSIUM 50 MG/1
50 TABLET ORAL DAILY
COMMUNITY
Start: 2024-12-16

## 2025-01-10 RX ORDER — LIDOCAINE 50 MG/G
PATCH TOPICAL
COMMUNITY
Start: 2024-11-21

## 2025-01-10 SDOH — ECONOMIC STABILITY: FOOD INSECURITY: WITHIN THE PAST 12 MONTHS, THE FOOD YOU BOUGHT JUST DIDN'T LAST AND YOU DIDN'T HAVE MONEY TO GET MORE.: SOMETIMES TRUE

## 2025-01-10 SDOH — ECONOMIC STABILITY: FOOD INSECURITY: WITHIN THE PAST 12 MONTHS, YOU WORRIED THAT YOUR FOOD WOULD RUN OUT BEFORE YOU GOT MONEY TO BUY MORE.: SOMETIMES TRUE

## 2025-01-10 ASSESSMENT — PATIENT HEALTH QUESTIONNAIRE - PHQ9
2. FEELING DOWN, DEPRESSED OR HOPELESS: SEVERAL DAYS
SUM OF ALL RESPONSES TO PHQ QUESTIONS 1-9: 1
SUM OF ALL RESPONSES TO PHQ9 QUESTIONS 1 & 2: 1
1. LITTLE INTEREST OR PLEASURE IN DOING THINGS: NOT AT ALL

## 2025-01-10 NOTE — PATIENT INSTRUCTIONS
y programas de descuento.  Sitio web: https://www.[employer].org/  Línea de ayuda: [phone]  RX Assist  Lo que ofrecen: información sobre programas de medicamentos gratuitos y de bajo costo.  Sitio web: https://www.rxassist.org/      Programa de recetas de $4 de [employer]  Lo que ofrecen: el Programa de recetas incluye un suministro de hasta 30 días por $4 y un suministro de hasta 90 días por $10 en algunos fármacos genéricos cubiertos en las dosis frecuentemente recetadas.  [employer] web: https://www.[employer]/cp/4-prescriptions/3847244.          Servicios públicos  [facility]  Lo que ofrecen: Sociedad con el Departamento de Servicios Sociales [facility]. Ayuda para encontrar y aplicar a programas y beneficios financiados por el gobierno. Usted también puede actualizar walker beneficios o informar cambios a través de [facility].  [employer] web: https://www.[place].[facility]/  Número de teléfono: [phone] ([phone]).    [facility] Energyare  Lo que ofrecen: [employer] es el programa de asistencia energética de [employer] de último recurso para cualquier persona con dificultades financieras por el desempleo o crisis familiar.  Número de teléfono: [phone]  Dirección: [address] [place] [address].  [employer] web: https://www.[employer]/[place]/billing/billing-options/energyshare      Programas de asistencia energética (EAP) del Departamento de Servicios Sociales  Lo que ofrecen: Los Programas de asistencia energética (Energy Assistance Program, EAP) ayudan a hogares de bajos ingresos a satisfacer walker necesidades inmediatas de energía doméstica.  Sitio web: https://www.dss.[place].gov/benefit/ea/.  Asistencia disponible:  Asistencia en crisis: emergencias de calefacción.  Asistencia de combustible: compensación de los costos de combustible para calefacción.  Asistencia para aire acondicionado: se aplica a las facturas de servicios y equipos de aire acondicionado.  Cómo enviar la

## 2025-01-10 NOTE — PROGRESS NOTES
Lida Wayne (:  1965) is a 59 y.o. female,New patient, here for evaluation of the following chief complaint(s):  New Patient and Establish Care         Assessment & Plan  Encounter to establish care    Baseline labs on today.     Orders:    CBC with Auto Differential; Future    Comprehensive Metabolic Panel; Future    Pain in both upper extremities    Physical exam reassuring. High suspicion for DJD. Educated on intermittent heat/ice application. Start topical diclofenac PRN. Advised on indication for avoidance of oral NSAIDs in setting of hypertension.     Orders:    XR SHOULDER BILATERAL STANDARD; Future    Chronic pain of right knee    Likely DJD, printed exercises provided. Trial topical diclofenac as well as intermittent heat/ice application as tolerated.     Orders:    XR KNEE RIGHT (3 VIEWS); Future    Screening for diabetes mellitus     Orders:    Hemoglobin A1C; Future    Screening for HIV (human immunodeficiency virus)     Orders:    HIV 1/2 Ag/Ab, 4TH Generation,W Rflx Confirm; Future    Need for hepatitis C screening test    Orders:    Hepatitis C Antibody; Future    Screening for hyperlipidemia     Orders:    Lipid Panel; Future    Will follow up pending test results and discuss any additional plan of care.  Will fax test results to the [facility] for continuity of care.   Encouraged to call with any questions or concerns in the interim.   Return if symptoms worsen or fail to improve.       Subjective   Ms. Wayne presents to establish care. Her daughter is present at this visit with her. States that she is generally doing well. She is under the care of the [facility] and was reportedly advised to establish care with this practice so that she can obtain labs and imaging. Reports following up with her PCP every 3 months or so. She has a longstanding hx of right knee pain. She applies lidocaine patches which does provide some temporary relief and then it returns.

## 2025-01-10 NOTE — PROGRESS NOTES
Chief Complaint   Patient presents with    New Patient    Establish Care     Pt states she started working about 2 months ago and has found that her arms go numb and ache. Pt has been putting pain patches as well as taking oral diclofenac but unsure of the mg.     \"Have you been to the ER, urgent care clinic since your last visit?  Hospitalized since your last visit?\"    Yes but not sure of the name    “Have you seen or consulted any other health care providers outside our system since your last visit?”    NO      “Have you had a colorectal cancer screening such as a colonoscopy/FIT/Cologuard?    NO    No colonoscopy on file  No cologuard on file  No FIT/FOBT on file   No flexible sigmoidoscopy on file

## 2025-01-11 LAB
ALBUMIN SERPL-MCNC: 4.2 G/DL (ref 3.8–4.9)
ALP SERPL-CCNC: 230 IU/L (ref 44–121)
ALT SERPL-CCNC: 39 IU/L (ref 0–32)
AST SERPL-CCNC: 34 IU/L (ref 0–40)
BASOPHILS # BLD AUTO: 0.1 X10E3/UL (ref 0–0.2)
BASOPHILS NFR BLD AUTO: 1 %
BILIRUB SERPL-MCNC: <0.2 MG/DL (ref 0–1.2)
BUN SERPL-MCNC: 19 MG/DL (ref 6–24)
BUN/CREAT SERPL: 37 (ref 9–23)
CALCIUM SERPL-MCNC: 9 MG/DL (ref 8.7–10.2)
CHLORIDE SERPL-SCNC: 107 MMOL/L (ref 96–106)
CHOLEST SERPL-MCNC: 163 MG/DL (ref 100–199)
CO2 SERPL-SCNC: 22 MMOL/L (ref 20–29)
CREAT SERPL-MCNC: 0.51 MG/DL (ref 0.57–1)
EGFRCR SERPLBLD CKD-EPI 2021: 107 ML/MIN/1.73
EOSINOPHIL # BLD AUTO: 0.2 X10E3/UL (ref 0–0.4)
EOSINOPHIL NFR BLD AUTO: 2 %
ERYTHROCYTE [DISTWIDTH] IN BLOOD BY AUTOMATED COUNT: 12.6 % (ref 11.7–15.4)
GLOBULIN SER CALC-MCNC: 2.9 G/DL (ref 1.5–4.5)
GLUCOSE SERPL-MCNC: 102 MG/DL (ref 70–99)
HBA1C MFR BLD: 6.1 % (ref 4.8–5.6)
HCT VFR BLD AUTO: 40.7 % (ref 34–46.6)
HCV IGG SERPL QL IA: NON REACTIVE
HDLC SERPL-MCNC: 40 MG/DL
HGB BLD-MCNC: 13.1 G/DL (ref 11.1–15.9)
HIV 1+2 AB+HIV1 P24 AG SERPL QL IA: NON REACTIVE
IMM GRANULOCYTES # BLD AUTO: 0 X10E3/UL (ref 0–0.1)
IMM GRANULOCYTES NFR BLD AUTO: 0 %
IMP & REVIEW OF LAB RESULTS: NORMAL
LDLC SERPL CALC-MCNC: 98 MG/DL (ref 0–99)
LYMPHOCYTES # BLD AUTO: 4 X10E3/UL (ref 0.7–3.1)
LYMPHOCYTES NFR BLD AUTO: 44 %
MCH RBC QN AUTO: 29 PG (ref 26.6–33)
MCHC RBC AUTO-ENTMCNC: 32.2 G/DL (ref 31.5–35.7)
MCV RBC AUTO: 90 FL (ref 79–97)
MONOCYTES # BLD AUTO: 0.5 X10E3/UL (ref 0.1–0.9)
MONOCYTES NFR BLD AUTO: 6 %
NEUTROPHILS # BLD AUTO: 4.3 X10E3/UL (ref 1.4–7)
NEUTROPHILS NFR BLD AUTO: 47 %
PLATELET # BLD AUTO: 298 X10E3/UL (ref 150–450)
POTASSIUM SERPL-SCNC: 3.8 MMOL/L (ref 3.5–5.2)
PROT SERPL-MCNC: 7.1 G/DL (ref 6–8.5)
RBC # BLD AUTO: 4.52 X10E6/UL (ref 3.77–5.28)
SODIUM SERPL-SCNC: 142 MMOL/L (ref 134–144)
TRIGL SERPL-MCNC: 139 MG/DL (ref 0–149)
VLDLC SERPL CALC-MCNC: 25 MG/DL (ref 5–40)
WBC # BLD AUTO: 9 X10E3/UL (ref 3.4–10.8)

## 2025-01-13 ENCOUNTER — TELEPHONE (OUTPATIENT)
Age: 60
End: 2025-01-13

## 2025-01-13 LAB — SPECIMEN STATUS REPORT: NORMAL

## 2025-01-13 ASSESSMENT — ENCOUNTER SYMPTOMS
ABDOMINAL PAIN: 0
SHORTNESS OF BREATH: 0

## 2025-01-13 NOTE — TELEPHONE ENCOUNTER
: [id number] Elliot   Session Code: 97054  Language Used: Venezuelan    Called patient with LS but pt did not answer. LVM to call back.       RN - CNP sent at 1/13/2025 12:40 PM EST -----  Please add on GGT, vitamin D, TSH.  As expected, she has arthritis to her knee and shoulders.  The knee appears to be more severe.  Order for thoughts on physical therapy?  Her kidneys show some dehydration as well as elevated liver enzymes.  She should increase her water intake to a minimum of 70 ounces daily and lets repeat a CMP and CBC with differential in 2 weeks.  A1c is in the prediabetes range, she needs to cut back on carbs/sugars and aim for 30 minutes of aerobic exercise daily.  Other labs including cholesterol looks good.  Please let her know that we have added on labs to further explore elevated liver enzymes, depending on what they show we may or may not get an ultrasound of her liver.  Please ask if she would like us to send labs over to [facility] and if so go ahead and send along with office note.

## 2025-01-14 DIAGNOSIS — E55.9 VITAMIN D DEFICIENCY: Primary | ICD-10-CM

## 2025-01-14 DIAGNOSIS — R79.89 LOW TSH LEVEL: ICD-10-CM

## 2025-01-14 LAB
25(OH)D3+25(OH)D2 SERPL-MCNC: 14.5 NG/ML (ref 30–100)
GGT SERPL-CCNC: 36 IU/L (ref 0–60)
TSH SERPL DL<=0.005 MIU/L-ACNC: <0.005 UIU/ML (ref 0.45–4.5)

## 2025-01-14 RX ORDER — ERGOCALCIFEROL 1.25 MG/1
50000 CAPSULE, LIQUID FILLED ORAL WEEKLY
Qty: 12 CAPSULE | Refills: 1 | Status: SHIPPED | OUTPATIENT
Start: 2025-01-14

## 2025-01-15 ENCOUNTER — TELEPHONE (OUTPATIENT)
Age: 60
End: 2025-01-15

## 2025-01-15 DIAGNOSIS — M79.602 PAIN IN BOTH UPPER EXTREMITIES: Primary | ICD-10-CM

## 2025-01-15 DIAGNOSIS — R89.9 ABNORMAL LABORATORY TEST: ICD-10-CM

## 2025-01-15 DIAGNOSIS — R74.8 ELEVATED LIVER ENZYMES: ICD-10-CM

## 2025-01-15 DIAGNOSIS — M79.601 PAIN IN BOTH UPPER EXTREMITIES: Primary | ICD-10-CM

## 2025-01-15 NOTE — TELEPHONE ENCOUNTER
----- Message from JOE Calderon CNP sent at 1/14/2025 11:39 AM EST -----  Vitamin D is deficient, I have sent in prescription vitamin D.  As her TSH was low, I need to obtain further thyroid labs.  I have placed these orders, the closest [facility] to her is probably the [facility].  It is possible that her elevated heart rate   is related to her thyroid.

## 2025-01-15 NOTE — TELEPHONE ENCOUNTER
ID/Name: Sylvie [id number]  Session Code: 24201  Language: Amharic    Lida Wayne was called and verbalized understanding on note below.     ----- Message from JOE Calderon CNP sent at 1/14/2025 11:39 AM EST -----  Vitamin D is deficient, I have sent in prescription vitamin D.  As her TSH was low, I need to obtain further thyroid labs.  I have placed these orders, the closest [facility] to her is probably the [facility].  It is possible that her elevated heart rate   is related to her thyroid.

## 2025-01-15 NOTE — TELEPHONE ENCOUNTER
Lida Wayne was called and verbalized understanding on note below.     ----- Message from JOE Calderon CNP sent at 1/13/2025 12:40 PM EST -----  Please add on GGT, vitamin D, TSH.  As expected, she has arthritis to her knee and shoulders.  The knee appears to be more severe.  Order for thoughts on physical therapy?  Her kidneys show some dehydration as well as elevated liver enzymes.  She should increase her water intake to a minimum of 70 ounces daily and lets repeat a CMP and CBC with differential in 2 weeks.  A1c is in the prediabetes range, she needs to cut back on carbs/sugars and aim for 30 minutes of aerobic exercise daily.  Other labs including cholesterol looks good.  Please let her know that we have added on labs to further explore elevated liver enzymes, depending on what they show we may or may not get an ultrasound of her liver.  Please ask if she would like us to send labs over to [facility] and if so go ahead and send along with office note.

## 2025-01-29 DIAGNOSIS — R74.8 ELEVATED LIVER ENZYMES: ICD-10-CM

## 2025-01-29 DIAGNOSIS — R89.9 ABNORMAL LABORATORY TEST: ICD-10-CM

## 2025-03-31 ENCOUNTER — HOSPITAL ENCOUNTER (OUTPATIENT)
Facility: HOSPITAL | Age: 60
Discharge: HOME | End: 2025-04-03
Payer: MEDICAID

## 2025-03-31 ENCOUNTER — TRANSCRIBE ORDERS (OUTPATIENT)
Facility: HOSPITAL | Age: 60
End: 2025-03-31

## 2025-03-31 DIAGNOSIS — M25.811 IMPINGEMENT OF RIGHT SHOULDER: ICD-10-CM

## 2025-03-31 DIAGNOSIS — M25.812 IMPINGEMENT OF LEFT SHOULDER: ICD-10-CM

## 2025-03-31 DIAGNOSIS — R20.2 PARESTHESIA: ICD-10-CM

## 2025-03-31 DIAGNOSIS — R20.2 PARESTHESIA: Primary | ICD-10-CM

## 2025-03-31 PROCEDURE — 72040 X-RAY EXAM NECK SPINE 2-3 VW: CPT

## 2025-03-31 PROCEDURE — 73030 X-RAY EXAM OF SHOULDER: CPT

## (undated) DEVICE — DERMABOND SKIN ADH 0.7ML -- DERMABOND ADVANCED 12/BX

## (undated) DEVICE — PLASTICS CHEST BREAST ASU: Brand: MEDLINE INDUSTRIES, INC.

## (undated) DEVICE — SMOKE EVACUATION PENCIL: Brand: VALLEYLAB

## (undated) DEVICE — GLOVE ORANGE PI 7 1/2   MSG9075

## (undated) DEVICE — SUTURE MCRYL SZ 4-0 L27IN ABSRB UD L19MM PS-2 1/2 CIR PRIM Y426H

## (undated) DEVICE — BLADE ES L4IN INSUL EDGE

## (undated) DEVICE — SUTURE VCRL SZ 3-0 L27IN ABSRB UD L26MM SH 1/2 CIR J416H

## (undated) DEVICE — SYR 10ML LUER LOK 1/5ML GRAD --

## (undated) DEVICE — HYPODERMIC SAFETY NEEDLE: Brand: MAGELLAN

## (undated) DEVICE — GARMENT,MEDLINE,DVT,INT,CALF,MED, GEN2: Brand: MEDLINE

## (undated) DEVICE — SUT SLK 2-0SH 30IN BLK --

## (undated) DEVICE — SOLUTION IRRIG 1000ML 0.9% SOD CHL USP POUR PLAS BTL